# Patient Record
Sex: FEMALE | Race: WHITE | NOT HISPANIC OR LATINO | Employment: FULL TIME | URBAN - NONMETROPOLITAN AREA
[De-identification: names, ages, dates, MRNs, and addresses within clinical notes are randomized per-mention and may not be internally consistent; named-entity substitution may affect disease eponyms.]

---

## 2018-09-24 ENCOUNTER — OFFICE VISIT (OUTPATIENT)
Dept: NEUROSURGERY | Facility: CLINIC | Age: 38
End: 2018-09-24

## 2018-09-24 ENCOUNTER — HOSPITAL ENCOUNTER (OUTPATIENT)
Dept: GENERAL RADIOLOGY | Facility: HOSPITAL | Age: 38
Discharge: HOME | End: 2018-09-24
Admitting: NURSE PRACTITIONER

## 2018-09-24 VITALS
WEIGHT: 163.8 LBS | SYSTOLIC BLOOD PRESSURE: 108 MMHG | DIASTOLIC BLOOD PRESSURE: 70 MMHG | BODY MASS INDEX: 30.14 KG/M2 | HEIGHT: 62 IN

## 2018-09-24 DIAGNOSIS — G89.29 CHRONIC BILATERAL LOW BACK PAIN WITH LEFT-SIDED SCIATICA: ICD-10-CM

## 2018-09-24 DIAGNOSIS — Z78.9 NONSMOKER: ICD-10-CM

## 2018-09-24 DIAGNOSIS — M54.42 CHRONIC BILATERAL LOW BACK PAIN WITH LEFT-SIDED SCIATICA: ICD-10-CM

## 2018-09-24 DIAGNOSIS — M54.16 LUMBAR RADICULOPATHY: ICD-10-CM

## 2018-09-24 DIAGNOSIS — M54.2 CERVICALGIA: Primary | ICD-10-CM

## 2018-09-24 DIAGNOSIS — M54.12 CERVICAL RADICULOPATHY: ICD-10-CM

## 2018-09-24 PROCEDURE — 99204 OFFICE O/P NEW MOD 45 MIN: CPT | Performed by: NURSE PRACTITIONER

## 2018-09-24 PROCEDURE — 72114 X-RAY EXAM L-S SPINE BENDING: CPT

## 2018-09-24 RX ORDER — IBUPROFEN 600 MG/1
TABLET ORAL
COMMUNITY
Start: 2018-08-02

## 2018-09-24 RX ORDER — CYCLOBENZAPRINE HCL 10 MG
TABLET ORAL
COMMUNITY
Start: 2018-09-19

## 2018-09-24 RX ORDER — DULOXETIN HYDROCHLORIDE 30 MG/1
CAPSULE, DELAYED RELEASE ORAL
COMMUNITY
Start: 2018-09-12 | End: 2019-06-19

## 2018-09-24 RX ORDER — DIAZEPAM 10 MG/1
TABLET ORAL
COMMUNITY
Start: 2018-09-19

## 2018-09-24 NOTE — PATIENT INSTRUCTIONS

## 2018-09-24 NOTE — PROGRESS NOTES
Chief complaint:   Chief Complaint   Patient presents with   • Neck and back pain     Ame is here today with complaint of neck and right shoulder and arm pain and numbness, she also has back pain with some left hip and leg pain, but her main complaint is her neck pain. She has not had any recent physical therapy, she does bring with her today an x-ray of her cervical.  No pain management.        Subjective     HPI: This is a 38-year-old who is referred to us by MICHELLE Saunders for neck and back pain as well as radiculopathy.  She is here to be evaluated today.  She says that the pain in her back is been going on for several years.  The pain in her back is constant.  Its worse with lifting and standing in one position.  She also complains of pain that radiates over into her left lower extremity in a lateral radicular fashion to her foot.  She says this pain is constant and worse with lifting and standing and better with position change.  She does have some numbness and tingling in her foot.  She also complains of about 9 months of neck pain that is constant.  It is worse with lifting and better with ice.  The pain radiate down into her right upper extremity in a radicular fashion to her hand.  Again this is worse with lifting and better with ice.  She has numbness and tingling in her hand as well.  Denies any bowel or bladder dysfunction.  She says the pain as a dull aching pain except in her extremities it is a sharp shooting pain.  She has not done any recent physical therapy, chiropractic care, pain management injections.  She rates the pain on scale 0-10 at an 8.  She says it does interfere with activities of daily living.  She does not smoke or drink alcohol.  She currently is not working.  She says that she used to work as a .  She does have past medical history of hypertension as well as depression.  She is right-hand dominant.    Review of Systems   Constitutional: Positive for fatigue.  "  HENT: Positive for dental problem and sinus pressure.    Eyes: Positive for redness and itching.   Respiratory: Positive for shortness of breath.    Cardiovascular: Positive for palpitations.   Gastrointestinal: Positive for constipation.   Endocrine: Positive for heat intolerance.   Genitourinary: Positive for urgency.   Musculoskeletal: Positive for back pain, joint swelling, neck pain and neck stiffness.   Neurological: Positive for dizziness, weakness, light-headedness, numbness and headaches.   Psychiatric/Behavioral: Positive for agitation, decreased concentration and sleep disturbance. The patient is nervous/anxious.    All other systems reviewed and are negative.       Past Medical History:   Diagnosis Date   • Fibromyalgia    • Hypertension      Past Surgical History:   Procedure Laterality Date   • ECTOPIC PREGNANCY SURGERY     • HEMORROIDECTOMY     • LAPAROSCOPIC TUBAL LIGATION     • TONSILLECTOMY       Family History   Problem Relation Age of Onset   • Heart disease Mother    • Arthritis Mother    • Asthma Mother    • Drug abuse Mother    • Hypertension Mother    • Asthma Father    • Asthma Sister    • Asthma Brother    • Asthma Sister    • Asthma Sister      Social History   Substance Use Topics   • Smoking status: Never Smoker   • Smokeless tobacco: Never Used   • Alcohol use No       (Not in a hospital admission)  Allergies:  Sulfa antibiotics    Objective      Vital Signs  /70 (BP Location: Right arm, Patient Position: Sitting)   Ht 157.5 cm (62\")   Wt 74.3 kg (163 lb 12.8 oz)   BMI 29.96 kg/m²     Physical Exam   Constitutional: She is oriented to person, place, and time. She appears well-developed and well-nourished.   HENT:   Head: Normocephalic.   Eyes: Pupils are equal, round, and reactive to light. Conjunctivae, EOM and lids are normal.   Neck: Normal range of motion.   Cardiovascular: Normal rate, regular rhythm and normal heart sounds.    Pulmonary/Chest: Effort normal and breath " sounds normal.   Abdominal: Normal appearance.   Musculoskeletal: Normal range of motion.        Cervical back: She exhibits pain.        Lumbar back: She exhibits pain.   Neurological: She is alert and oriented to person, place, and time. She has normal strength and normal reflexes. She displays normal reflexes. No cranial nerve deficit or sensory deficit. GCS eye subscore is 4. GCS verbal subscore is 5. GCS motor subscore is 6.   Skin: Skin is warm.   Psychiatric: She has a normal mood and affect. Her speech is normal and behavior is normal. Thought content normal. Cognition and memory are normal.       Results Review: X-rays of the cervical spine shows the patient may have some foraminal narrowing at C5-6 on the right.  No significant disc degeneration or malalignment that I can appreciate.          Assessment/Plan: At this point I am going to start the patient into a dedicated course of physical therapy consisting of 2-3 times a week for 4-6 weeks.  In addition of that I will also send her for set of x-rays of her lumbar spine to include standing flexion and extension.  I will follow-up again with her in 6-8 weeks.  Should she not have any improvement from the physical therapy we can consider getting an MRI of her cervical and lumbar spine.  BMI shows that she is overweight.  BMI chart was given the patient.  She is a nonsmoker.      Ame was seen today for neck and back pain.    Diagnoses and all orders for this visit:    Cervicalgia  -     XR Spine Lumbar Complete With Flex & Ext  -     Ambulatory Referral to Physical Therapy Evaluate and treat; Strengthening, Stretching; Full weight bearing    Cervical radiculopathy  -     XR Spine Lumbar Complete With Flex & Ext  -     Ambulatory Referral to Physical Therapy Evaluate and treat; Strengthening, Stretching; Full weight bearing    Lumbar radiculopathy  -     XR Spine Lumbar Complete With Flex & Ext  -     Ambulatory Referral to Physical Therapy Evaluate and  treat; Strengthening, Stretching; Full weight bearing    Chronic bilateral low back pain with left-sided sciatica  -     XR Spine Lumbar Complete With Flex & Ext  -     Ambulatory Referral to Physical Therapy Evaluate and treat; Strengthening, Stretching; Full weight bearing    BMI 30.0-30.9,adult    Nonsmoker          I discussed the patients findings and my recommendations with patient    Bernard Graham, APRN  09/24/18  1:32 PM

## 2019-06-19 ENCOUNTER — HOSPITAL ENCOUNTER (OUTPATIENT)
Dept: GENERAL RADIOLOGY | Facility: HOSPITAL | Age: 39
Discharge: HOME | End: 2019-06-19
Admitting: NURSE PRACTITIONER

## 2019-06-19 ENCOUNTER — OFFICE VISIT (OUTPATIENT)
Dept: NEUROSURGERY | Facility: CLINIC | Age: 39
End: 2019-06-19

## 2019-06-19 VITALS
HEIGHT: 62 IN | BODY MASS INDEX: 26.72 KG/M2 | SYSTOLIC BLOOD PRESSURE: 120 MMHG | DIASTOLIC BLOOD PRESSURE: 80 MMHG | WEIGHT: 145.2 LBS

## 2019-06-19 DIAGNOSIS — M54.16 LUMBAR RADICULOPATHY: ICD-10-CM

## 2019-06-19 DIAGNOSIS — Z78.9 NONSMOKER: ICD-10-CM

## 2019-06-19 DIAGNOSIS — G89.29 CHRONIC RIGHT SHOULDER PAIN: ICD-10-CM

## 2019-06-19 DIAGNOSIS — G56.01 CARPAL TUNNEL SYNDROME OF RIGHT WRIST: ICD-10-CM

## 2019-06-19 DIAGNOSIS — M25.511 CHRONIC RIGHT SHOULDER PAIN: ICD-10-CM

## 2019-06-19 DIAGNOSIS — M54.2 CERVICALGIA: ICD-10-CM

## 2019-06-19 DIAGNOSIS — M54.12 CERVICAL RADICULOPATHY: Primary | ICD-10-CM

## 2019-06-19 PROCEDURE — 73030 X-RAY EXAM OF SHOULDER: CPT

## 2019-06-19 PROCEDURE — 99214 OFFICE O/P EST MOD 30 MIN: CPT | Performed by: NURSE PRACTITIONER

## 2019-06-19 NOTE — PROGRESS NOTES
"    Chief complaint:   Chief Complaint   Patient presents with   • Neck Pain     Ame is returning today afte 6 weeks of physical therapy for her neck and right shoulder pain, she states the therapy would help for a couple days and then her pain would be right back to where it was before.         Subjective     HPI: This is a 39-year-old female patient who we have been following for neck and back pain.  She is here to be evaluated today.  She did go for dedicated course of physical therapy states that she did not have any lasting improvement.  She says that therapy would help for a couple days and then her pain returned.  Her biggest complaint now is neck and arm pain on the right.  However the pain she does feel is more in her shoulder area at this time.  She is also complaining of radicular arm pain and numbness and tingling going into her right arm as well.  She says the numbness and more intense at night in her right hand.  Continues to have the back pain but she says this is not her biggest complaint at this time.  She says the pain in her neck is worse with activities and better with rest along with the pain in her shoulder.  Denies any bowel or bladder incontinence.  Rates her pain on a scale of 0-10 at a 7.  He says it can interfere with activities of daily living.    Review of Systems   Musculoskeletal: Positive for arthralgias, back pain and neck pain.   Neurological: Positive for numbness.         Objective      Vital Signs  /80 (BP Location: Right arm, Patient Position: Sitting)   Ht 157.5 cm (62\")   Wt 65.9 kg (145 lb 3.2 oz)   BMI 26.56 kg/m²     Physical Exam   Constitutional: She is oriented to person, place, and time. She appears well-developed and well-nourished.   HENT:   Head: Normocephalic.   Eyes: Conjunctivae, EOM and lids are normal. Pupils are equal, round, and reactive to light.   Neck: Normal range of motion.   Cardiovascular: Normal rate, regular rhythm and normal heart " sounds.   Pulmonary/Chest: Effort normal and breath sounds normal.   Abdominal: Normal appearance.   Musculoskeletal: Normal range of motion.        Right shoulder: She exhibits pain.        Cervical back: She exhibits pain.        Lumbar back: She exhibits pain.   Pain with active and passive range of motion of the right shoulder   Neurological: She is alert and oriented to person, place, and time. She has normal strength and normal reflexes. She displays normal reflexes. No cranial nerve deficit or sensory deficit. GCS eye subscore is 4. GCS verbal subscore is 5. GCS motor subscore is 6.   Skin: Skin is warm.   Psychiatric: She has a normal mood and affect. Her speech is normal and behavior is normal. Thought content normal. Cognition and memory are normal.       Results Review: X-rays of the lumbar spine did show degeneration at L5-S1    X-rays of the cervical spine did show foraminal narrowing on the right at C5-6          Assessment/Plan: At this point I think the patient may have a few issues going on.  I am concerned that the patient is having cervical radiculopathy from the foraminal narrowing that is causing her neck pain and right arm symptoms.  I also think the patient is suffering from shoulder issue as well.  We are going to get an x-ray of her right shoulder and obtain an MRI of her right shoulder.  We will also obtain an MRI of her cervical spine.  In addition I think she may also be suffering from carpal tunnel syndrome.  We will get a nerve conduction study to make sure that she does have before looking into any interventions.  We will follow-up with her once this is completed.  BMI shows that she is overweight.  BMI chart was given to the patient.  She is a non-smoker        Ame was seen today for neck pain.    Diagnoses and all orders for this visit:    Cervical radiculopathy  -     MRI Cervical Spine Without Contrast; Future    Cervicalgia  -     MRI Cervical Spine Without Contrast;  Future    Lumbar radiculopathy    Chronic right shoulder pain  -     XR Shoulder 2+ View Right; Future  -     MRI shoulder right wo contrast; Future    Carpal tunnel syndrome of right wrist  -     EMG & Nerve Conduction Test; Future    Nonsmoker    BMI 26.0-26.9,adult        I discussed the patients findings and my recommendations with patient  Bernard Graham, APRN  06/19/19  10:35 AM

## 2019-06-19 NOTE — PATIENT INSTRUCTIONS

## 2019-07-08 ENCOUNTER — HOSPITAL ENCOUNTER (OUTPATIENT)
Dept: MRI IMAGING | Facility: HOSPITAL | Age: 39
Discharge: HOME | End: 2019-07-08

## 2019-07-08 ENCOUNTER — HOSPITAL ENCOUNTER (OUTPATIENT)
Dept: NEUROLOGY | Facility: HOSPITAL | Age: 39
Discharge: HOME | End: 2019-07-08
Admitting: NURSE PRACTITIONER

## 2019-07-08 DIAGNOSIS — G56.01 CARPAL TUNNEL SYNDROME OF RIGHT WRIST: ICD-10-CM

## 2019-07-08 DIAGNOSIS — G89.29 CHRONIC RIGHT SHOULDER PAIN: ICD-10-CM

## 2019-07-08 DIAGNOSIS — M54.2 CERVICALGIA: ICD-10-CM

## 2019-07-08 DIAGNOSIS — M54.12 CERVICAL RADICULOPATHY: ICD-10-CM

## 2019-07-08 DIAGNOSIS — M25.511 CHRONIC RIGHT SHOULDER PAIN: ICD-10-CM

## 2019-07-08 PROCEDURE — 95886 MUSC TEST DONE W/N TEST COMP: CPT

## 2019-07-08 PROCEDURE — 95909 NRV CNDJ TST 5-6 STUDIES: CPT

## 2019-07-08 PROCEDURE — 73221 MRI JOINT UPR EXTREM W/O DYE: CPT

## 2019-07-08 PROCEDURE — 72141 MRI NECK SPINE W/O DYE: CPT

## 2019-07-11 ENCOUNTER — OFFICE VISIT (OUTPATIENT)
Dept: NEUROSURGERY | Facility: CLINIC | Age: 39
End: 2019-07-11

## 2019-07-11 VITALS
SYSTOLIC BLOOD PRESSURE: 110 MMHG | BODY MASS INDEX: 26.68 KG/M2 | HEIGHT: 62 IN | DIASTOLIC BLOOD PRESSURE: 60 MMHG | WEIGHT: 145 LBS

## 2019-07-11 DIAGNOSIS — Z78.9 NONSMOKER: ICD-10-CM

## 2019-07-11 DIAGNOSIS — M54.2 CERVICALGIA: ICD-10-CM

## 2019-07-11 DIAGNOSIS — G89.29 CHRONIC RIGHT SHOULDER PAIN: ICD-10-CM

## 2019-07-11 DIAGNOSIS — M25.511 CHRONIC RIGHT SHOULDER PAIN: ICD-10-CM

## 2019-07-11 DIAGNOSIS — M54.12 CERVICAL RADICULOPATHY: Primary | ICD-10-CM

## 2019-07-11 PROCEDURE — 99213 OFFICE O/P EST LOW 20 MIN: CPT | Performed by: NURSE PRACTITIONER

## 2019-07-11 NOTE — PROGRESS NOTES
Chief complaint:   Chief Complaint   Patient presents with   • Neck Pain     Ame is returning for the results on her recent EMG/NCV of RUE, the results are in her chart.        Subjective     HPI: This is a 39-year-old female patient who we have been following for neck and back pain.  She is here to be evaluated today.  She did go for dedicated course of physical therapy states that she did not have any lasting improvement.  She says that therapy would help for a couple days and then her pain returned.  Her biggest complaint now is the right shoulder.  However the pain she does feel is more in her shoulder area at this time.  She is also complaining of radicular arm pain and numbness and tingling going into her right arm as well.  She says the numbness and more intense at night in her right hand but she says the numbness is not consistent and he can go from one part of her hand to the other.  Continues to have the back pain but she says this is not her biggest complaint at this time.  She says the pain in her neck is worse with activities and better with rest along with the pain in her shoulder.  Denies any bowel or bladder incontinence.  Rates her pain on a scale of 0-10 at a 7.    She says it can interfere with activities of daily living.  We did send the patient for nerve conduction study as well as an MRI of her cervical and right shoulder.  She is here in evaluation from this.    Review of Systems   Musculoskeletal: Positive for arthralgias, back pain and neck pain.   Neurological: Positive for numbness.        Past Medical History:   Diagnosis Date   • Fibromyalgia    • Hypertension      Past Surgical History:   Procedure Laterality Date   • ECTOPIC PREGNANCY SURGERY     • HEMORROIDECTOMY     • LAPAROSCOPIC TUBAL LIGATION     • TONSILLECTOMY       Family History   Problem Relation Age of Onset   • Heart disease Mother    • Arthritis Mother    • Asthma Mother    • Drug abuse Mother    • Hypertension  "Mother    • Asthma Father    • Asthma Sister    • Asthma Brother    • Asthma Sister    • Asthma Sister      Social History     Tobacco Use   • Smoking status: Never Smoker   • Smokeless tobacco: Never Used   Substance Use Topics   • Alcohol use: No   • Drug use: No       (Not in a hospital admission)  Allergies:  Sulfa antibiotics    Objective      Vital Signs  /60 (BP Location: Right arm, Patient Position: Sitting)   Ht 157.5 cm (62\")   Wt 65.8 kg (145 lb)   BMI 26.52 kg/m²     Physical Exam   Constitutional: She is oriented to person, place, and time. She appears well-developed and well-nourished.   HENT:   Head: Normocephalic.   Eyes: Conjunctivae, EOM and lids are normal. Pupils are equal, round, and reactive to light.   Neck: Normal range of motion.   Cardiovascular: Normal rate, regular rhythm and normal heart sounds.   Pulmonary/Chest: Effort normal and breath sounds normal.   Abdominal: Normal appearance.   Musculoskeletal: Normal range of motion.        Right shoulder: She exhibits pain.        Cervical back: She exhibits pain.        Lumbar back: She exhibits pain.   Pain with active and passive range of motion of the right shoulder   Neurological: She is alert and oriented to person, place, and time. She has normal strength and normal reflexes. She displays normal reflexes. No cranial nerve deficit or sensory deficit. GCS eye subscore is 4. GCS verbal subscore is 5. GCS motor subscore is 6.   Skin: Skin is warm.   Psychiatric: She has a normal mood and affect. Her speech is normal and behavior is normal. Thought content normal. Cognition and memory are normal.       Results Review: EMG/NCS of the right upper extremity shows patient does have mild carpal tunnel syndrome    MRI of the cervical spine that was done at [facility] on July 8, 2019 shows the patient does have small disc bulging at C5-6 that is causing mild neuroforaminal narrowing..  No central canal narrowing.  No fracture visualized.  No " cord signal change.    MRI of the right shoulder shows the patient does have joint arthropathy with periarticular edema.  Bursitis is also noticed and also a small tear in the supra scapularis tendon              Assessment/Plan: At this point I do not think the patient is having any symptoms from the narrowing in her cervical spine.  I do not think that her symptoms are consistent with carpal tunnel at this point as well.  She is continued to have some shoulder issues and I think does not need to be seen by an orthopedic surgeon for evaluation.  There is nothing from a neurosurgical standpoint that needs to be addressed at this time.  At this point we will need to see her on an as-needed basis.  She was told to call us if she had any further problems.  BMI shows the patient is Overweight. BMI chart was given to the patient. Patient is a non-smoker    Ame was seen today for neck pain.    Diagnoses and all orders for this visit:    Cervical radiculopathy    Cervicalgia    Chronic right shoulder pain    Nonsmoker    BMI 26.0-26.9,adult          I discussed the patients findings and my recommendations with patient    Bernard Graham, MICHELLE  07/11/19  2:29 PM

## 2019-07-11 NOTE — PATIENT INSTRUCTIONS

## 2022-02-28 ENCOUNTER — OFFICE VISIT (OUTPATIENT)
Dept: NEUROSURGERY | Facility: CLINIC | Age: 42
End: 2022-02-28

## 2022-02-28 VITALS
WEIGHT: 135.2 LBS | BODY MASS INDEX: 24.88 KG/M2 | SYSTOLIC BLOOD PRESSURE: 118 MMHG | DIASTOLIC BLOOD PRESSURE: 70 MMHG | HEIGHT: 62 IN

## 2022-02-28 DIAGNOSIS — M54.2 CERVICALGIA: Primary | ICD-10-CM

## 2022-02-28 DIAGNOSIS — Z78.9 NONSMOKER: ICD-10-CM

## 2022-02-28 PROCEDURE — 99213 OFFICE O/P EST LOW 20 MIN: CPT | Performed by: NURSE PRACTITIONER

## 2022-02-28 NOTE — PROGRESS NOTES
Chief complaint:   Chief Complaint   Patient presents with   • Neck Pain     Ame is returning today with an x-ray of her cervical from [facility] for a return in her neck pain with left shoulder pain.  She was seen about 2 years ago and now the pain is gotten worse, she is presently in physical therapy.  No previous neck surgeries.        Subjective     HPI: This is a 42-year-old female patient who we have seen in the past for neck pain.  She comes in today for reevaluation.  The patient states that the pain in her neck has continued to be bothersome.  It did get worse in October 2021.  There is no accident or injury associated with this.  Pain in in the neck is constant.  Is worse with everything and nothing makes it better.  She has pain that radiates from her neck down over into her left shoulder.  She does not complain of pain past her shoulder.  She does have numbness and tingling in her left hand.  Denies any bowel or bladder incontinence.  She has not done any injections in her neck.  She is attempted chiropractic care without any relief.  She has been involved with physical therapy for the last 2 weeks and says that she still has a few more weeks to go.  She is working in housekeeping.  She denies any tobacco, alcohol, or illicit drug use.  She is right hand dominant.  Rates her pain on a scale 0-10 at a 7    Review of Systems   Constitutional: Positive for activity change.   Musculoskeletal: Positive for neck pain.   Neurological: Positive for numbness.   Psychiatric/Behavioral: Negative.    All other systems reviewed and are negative.       Past Medical History:   Diagnosis Date   • Cervical disc disorder    • Fibromyalgia    • Hypertension    • Left shoulder pain      Past Surgical History:   Procedure Laterality Date   • ECTOPIC PREGNANCY SURGERY     • HEMORROIDECTOMY     • LAPAROSCOPIC TUBAL LIGATION     • TONSILLECTOMY       Family History   Problem Relation Age of Onset   • Heart disease Mother   "  • Arthritis Mother    • Asthma Mother    • Drug abuse Mother    • Hypertension Mother    • Asthma Father    • Asthma Sister    • Asthma Brother    • Asthma Sister    • Asthma Sister      Social History     Tobacco Use   • Smoking status: Never Smoker   • Smokeless tobacco: Never Used   Substance Use Topics   • Alcohol use: No   • Drug use: No     (Not in a hospital admission)    Allergies:  Sulfa antibiotics    Objective      Vital Signs  /70   Ht 157.5 cm (62\")   Wt 61.3 kg (135 lb 3.2 oz)   BMI 24.73 kg/m²     Physical Exam  Constitutional:       Appearance: Normal appearance. She is well-developed.   HENT:      Head: Normocephalic.   Eyes:      General: Lids are normal.      Extraocular Movements: EOM normal.      Conjunctiva/sclera: Conjunctivae normal.      Pupils: Pupils are equal, round, and reactive to light.   Cardiovascular:      Rate and Rhythm: Normal rate and regular rhythm.   Pulmonary:      Effort: Pulmonary effort is normal.      Breath sounds: Normal breath sounds.   Musculoskeletal:         General: Normal range of motion.      Cervical back: Normal range of motion.      Comments: Neck pain   Skin:     General: Skin is warm.   Neurological:      Mental Status: She is alert and oriented to person, place, and time.      GCS: GCS eye subscore is 4. GCS verbal subscore is 5. GCS motor subscore is 6.      Cranial Nerves: No cranial nerve deficit.      Sensory: No sensory deficit.      Gait: Gait is intact.      Deep Tendon Reflexes: Strength normal and reflexes are normal and symmetric. Reflexes normal.   Psychiatric:         Speech: Speech normal.         Behavior: Behavior normal.         Thought Content: Thought content normal.         Neurologic Exam     Mental Status   Oriented to person, place, and time.   Attention: normal. Concentration: normal.   Speech: speech is normal   Level of consciousness: alert  Normal comprehension.     Cranial Nerves     CN II   Visual fields full to " confrontation.     CN III, IV, VI   Pupils are equal, round, and reactive to light.  Extraocular motions are normal.     CN V   Facial sensation intact.     CN VII   Facial expression full, symmetric.     CN VIII   CN VIII normal.     CN IX, X   CN IX normal.   CN X normal.     CN XI   CN XI normal.     CN XII   CN XII normal.     Motor Exam   Muscle bulk: normal    Strength   Strength 5/5 throughout.     Sensory Exam   Light touch normal.     Gait, Coordination, and Reflexes     Gait  Gait: normal    Reflexes   Reflexes 2+ except as noted.       Imaging review: X-ray of the cervical spine that was done October 28, 2021 does show mild disc degeneration at C5-6.  There is loss of the normal cervical curvature.  No fracture visualized        Assessment/Plan:   For first line conservative care of cervical pain, I would like to send Ms. Carter for a dedicated course of physician directed physical therapy consisting of 2-3 times a week for 4-6 weeks.  She can continue the therapy she is currently involved with    Return for reassessment with me after 4 weeks after physical therapy.     Should Ms. Carter not have any improvement from physical therapy, I think it would be prudent to send the patient for an MRI of the cervical spine to see if there is anything from a surgical standpoint that needs to be addressed.     I advised the patient to call and return sooner for new or worsening complaints of weakness, paresthesias, gait disturbances, or any additional concerns.  Treatment options discussed in detail with Ame and the patient voiced understanding.  Ms. Carter agrees with this plan of care.     Patient is a nonsmoker  The patient's Body mass index is 24.73 kg/m².. BMI is within normal parameters. No follow-up required.    Diagnoses and all orders for this visit:    1. Cervicalgia (Primary)    2. Body mass index (BMI) of 24.0-24.9 in adult    3. Nonsmoker          I discussed the patients findings and my  recommendations with patient    Bernard Graham, APRN  02/28/22  12:07 CST

## 2023-04-10 ENCOUNTER — TELEPHONE (OUTPATIENT)
Dept: NEUROSURGERY | Facility: CLINIC | Age: 43
End: 2023-04-10

## 2023-04-10 NOTE — TELEPHONE ENCOUNTER
"Caller: Ame Carter    Relationship: Self    Best call back number: [phone]    What is the best time to reach you: REQUESTING C/B BY EOD WED 4-12-23    Who are you requesting to speak with (clinical staff, provider,  specific staff member): P.M. / KANIKA     What was the call regarding: PATIENT CALLED, DESCRIBED WORSENING NECK PAIN RADIATING BILAT UE AND UPWARDS TO BASE OF SKULL.     PATIENT DID NOT WISH TO BE SCHEDULED WITH ESTABLISHED APRN; REQUESTED TO SEE DONALDO SMART APRN OF [facility] C[facility] TEAM.    I ADVISED CALLER WE DO NOT PERMIT CHANGING PROVIDERS WITHIN THE PRACTICE / CALLER REQUESTED I SUBMIT THE REQUEST FOR CHANGE TO [facility] TEAM SINCE (PER PATIENT): 'I'VE ONLY EVER SEEN A NURSE PRACTITIONER & MY [FAMILY MEMBER] SEES DR. BOB AND HE'S THEIR SURGEON.\"    Do you require a callback: YES; PATIENT IS FOLLOWING UP W/ PCP RE: WORSENING NECK SYMPTOMS AS SHE DID NOT WISH TO BE SCHEDULED OR TRANSFERRED TO CURRENT APRN.    SHE WAS AGREEABLE TO TIMEFRAME FOR C/B, IS CONTACTING PCP 'IMMEDIATELY' FOR WORSENING NECK SYMPTOMS, AND WILL AWAIT OUR CALL RE: PROVIDER / CARE TEAM CHANGE REQUEST. THANK YOU.   "